# Patient Record
Sex: FEMALE | Race: WHITE | NOT HISPANIC OR LATINO | Employment: STUDENT | URBAN - NONMETROPOLITAN AREA
[De-identification: names, ages, dates, MRNs, and addresses within clinical notes are randomized per-mention and may not be internally consistent; named-entity substitution may affect disease eponyms.]

---
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
## 2024-05-15 ENCOUNTER — OFFICE VISIT (OUTPATIENT)
Dept: FAMILY MEDICINE CLINIC | Facility: CLINIC | Age: 19
End: 2024-05-15
Payer: COMMERCIAL

## 2024-05-15 VITALS
RESPIRATION RATE: 18 BRPM | HEART RATE: 66 BPM | WEIGHT: 183.2 LBS | DIASTOLIC BLOOD PRESSURE: 64 MMHG | OXYGEN SATURATION: 98 % | SYSTOLIC BLOOD PRESSURE: 106 MMHG | TEMPERATURE: 98.2 F | HEIGHT: 71 IN | BODY MASS INDEX: 25.65 KG/M2

## 2024-05-15 DIAGNOSIS — F41.9 ANXIETY: ICD-10-CM

## 2024-05-15 DIAGNOSIS — Z00.00 WELL ADULT EXAM: Primary | ICD-10-CM

## 2024-05-15 DIAGNOSIS — M25.362 INSTABILITY OF BOTH KNEE JOINTS: ICD-10-CM

## 2024-05-15 DIAGNOSIS — M25.361 INSTABILITY OF BOTH KNEE JOINTS: ICD-10-CM

## 2024-05-15 PROBLEM — Z82.49 FAMILY HISTORY OF HEART DISEASE: Status: ACTIVE | Noted: 2023-05-05

## 2024-05-15 PROCEDURE — 2014F MENTAL STATUS ASSESS: CPT | Performed by: FAMILY MEDICINE

## 2024-05-15 PROCEDURE — 99395 PREV VISIT EST AGE 18-39: CPT | Performed by: FAMILY MEDICINE

## 2024-05-15 NOTE — PROGRESS NOTES
Female Physical Note      Date: 05/15/2024   Patient Name: Nereida Morfin  : 2005   MRN: [id number]     Chief Complaint:    Chief Complaint   Patient presents with    Annual Exam    Knee Injury     Rebel knees feel unstable.       History of Present Illness: Nereida Morfin is a 19 y.o. female who is here today for their annual health maintenance and physical.  Patient has been doing relatively well since last being seen but has been having some difficulty with bilateral knee pain.  Patient states that she has been having little bit of problems with pain with a little bit of instability.  She has noted that her left knee has had dislocation of her knee Recently but denies any recent trauma.  She does not have any problems with swelling at present time.  She is also having little bit increase in her anxiety.  She does not wish to start medication and has not seen her counselor for quite some time.  Otherwise she is doing well with her usual activity, appetite and sleep.  Patient denies any other cardiovascular, respiratory, gastrointestinal, urologic or neurologic complaints.      Subjective      Review of Systems:   Review of Systems   Constitutional:  Negative for activity change, appetite change and fatigue.   Respiratory:  Negative for cough, shortness of breath and wheezing.    Cardiovascular:  Negative for chest pain, palpitations and leg swelling.   Gastrointestinal:  Negative for abdominal distention, abdominal pain, blood in stool, constipation, diarrhea, nausea, vomiting, GERD and indigestion.   Genitourinary:  Negative for dysuria, flank pain, frequency, hematuria and urgency.   Musculoskeletal:  Negative for arthralgias, joint swelling, myalgias and bursitis.   Neurological:  Negative for dizziness, tremors, seizures, syncope, weakness, light-headedness, numbness, headache, memory problem and confusion.   Psychiatric/Behavioral:  Negative for sleep disturbance and depressed mood. The  patient is not nervous/anxious.        Past Medical History, Social History, Family History and Care Team were all reviewed with patient and updated as appropriate.     Medications:     Current Outpatient Medications:     Ketoconazole 1 % shampoo, Apply 1 application topically Every 72 (Seventy-Two) Hours., Disp: 200 mL, Rfl: 11    Allergies:   Allergies   Allergen Reactions    Other Anaphylaxis     INSECT STINGS - BEE STING       Immunizations:  Health Maintenance Summary            Postponed - COVID-19 Vaccine (2 - 2023-24 season) Postponed until 5/15/2025      05/15/2024  Postponed until 5/15/2025 by Lore Sheldon MA (Patient Refused)    01/05/2023  Imm Admin: Covid-19 (Pfizer) Gray Cap Monovalent              INFLUENZA VACCINE (Yearly - August to March) Next due on 8/1/2024 01/06/2023  Imm Admin: Fluzone (or Fluarix & Flulaval for VFC) >6mos    11/21/2018  Imm Admin: Flu Vaccine Quad PF >36MO    01/10/2014  Imm Admin: Influenza TIV (IM)    01/02/2013  Imm Admin: Influenza TIV (IM)    02/11/2009  Imm Admin: Influenza TIV (IM)    Only the first 5 history entries have been loaded, but more history exists.              ANNUAL PHYSICAL (Yearly) Next due on 5/15/2025      05/15/2024  Done    10/07/2022  Done              BMI FOLLOWUP (Yearly) Next due on 5/15/2025      05/15/2024  [employer]: WORKFLOW - QUALITY MEASUREMENT - DOCUMENTED WEIGHT FOLLOW-UP PLAN              TDAP/TD VACCINES (2 - Td or Tdap) Next due on 8/18/2025 08/18/2015  Imm Admin: Tdap              Pneumococcal Vaccine 0-64 (Series Information) Aged Out      07/19/2007  Imm Admin: PEDS-Pneumococcal Conjugate (PCV7)    2004  Imm Admin: PEDS-Pneumococcal Conjugate (PCV7)    2004  Imm Admin: PEDS-Pneumococcal Conjugate (PCV7)    2004  Imm Admin: PEDS-Pneumococcal Conjugate (PCV7)              MENINGOCOCCAL VACCINE (Series Information) Completed      02/01/2022  Imm Admin: Meningococcal Conjugate    08/18/2015  Imm Admin:  "Meningococcal MCV4P (Menactra)    08/18/2015  Imm Admin: MCV4 Unspecified              HPV VACCINES (Series Information) Completed      02/01/2022  Imm Admin: Hpv9    08/18/2015  Imm Admin: HPV Quadrivalent              HEPATITIS C SCREENING  Completed      10/07/2022  Hepatitis C Antibody              Discontinued - CHLAMYDIA SCREENING  Discontinued        Frequency changed to Never automatically (Topic No Longer Applies)    10/07/2022  Chlamydia trachomatis, HILL component of Chlamydia trachomatis, Neisseria gonorrhoeae, PCR - Urine, Urine, Clean Catch                     No orders of the defined types were placed in this encounter.       Colorectal Screening:   Deferred.  Last Completed Colonoscopy       This patient has no relevant Health Maintenance data.          Pap: Deferred.  Last Completed Pap Smear       This patient has no relevant Health Maintenance data.           Mammogram: Deferred.  Last Completed Mammogram       This patient has no relevant Health Maintenance data.             CT for Smoker (Age 50-80, 20 pk yr):   Deferred.  Bone Density/DEXA (Age 65 or high risk): Deferred.  Hep C (Age 18-79 once): Previously ordered.  HIV (Age 15-65 once): No results found for: \"HIV1X2\"  A1c: No results found for: \"HGBA1C\"   Lipid panel:  No results found for: \"LIPIDEXCLUSI\"    The ASCVD Risk score (Nathan DK, et al., 2019) failed to calculate for the following reasons:    The 2019 ASCVD risk score is only valid for ages 40 to 79    Dermatology: Advised if necessary.  Ophthalmologist: Advised.  Dentist: Advised.    Tobacco Use: Low Risk  (5/15/2024)    Patient History     Smoking Tobacco Use: Never     Smokeless Tobacco Use: Never     Passive Exposure: Never       Social History     Substance and Sexual Activity   Alcohol Use Not Currently    Comment: 1x per month        Social History     Substance and Sexual Activity   Drug Use Never        Diet/Physical activity: Well-balanced diet/daily exercise.    Sexual " "Health: No issues at present time.   Menopause: No issues at present time.  Menstrual Cycles: No issues at present time.    Depression: PHQ-2 Depression Screening  PHQ-9 Total Score: 0     Measures:   Advanced Care Planning:   Did not discuss.    Smoking Cessation:   Non-smoker.    Objective     Physical Exam:  Vital Signs:   Vitals:    05/15/24 1001   BP: 106/64   BP Location: Left arm   Patient Position: Sitting   Cuff Size: Adult   Pulse: 66   Resp: 18   Temp: 98.2 °F (36.8 °C)   TempSrc: Temporal   SpO2: 98%   Weight: 83.1 kg (183 lb 3.2 oz)   Height: 180.3 cm (71\")     Body mass index is 25.55 kg/m².   Facility age limit for growth %deanne is 20 years.    Physical Exam  Vitals and nursing note reviewed.   Constitutional:       Appearance: Normal appearance.   HENT:      Head: Normocephalic and atraumatic.      Nose: Nose normal.      Mouth/Throat:      Pharynx: Oropharynx is clear.   Eyes:      Extraocular Movements: Extraocular movements intact.      Pupils: Pupils are equal, round, and reactive to light.   Neck:      Thyroid: No thyroid mass or thyromegaly.      Trachea: Trachea normal.   Cardiovascular:      Rate and Rhythm: Normal rate and regular rhythm.      Pulses: Normal pulses. No decreased pulses.      Heart sounds: Normal heart sounds.   Pulmonary:      Effort: Pulmonary effort is normal.      Breath sounds: Normal breath sounds.   Abdominal:      General: Abdomen is flat. Bowel sounds are normal.      Palpations: Abdomen is soft.      Tenderness: There is no abdominal tenderness.   Musculoskeletal:      Cervical back: Neck supple.      Right lower leg: No edema.      Left lower leg: No edema.   Lymphadenopathy:      Cervical: No cervical adenopathy.   Skin:     General: Skin is warm and dry.   Neurological:      General: No focal deficit present.      Mental Status: She is alert and oriented to person, place, and time.      Sensory: Sensation is intact.      Motor: Motor function is intact.      " Coordination: Coordination is intact.   Psychiatric:         Attention and Perception: Attention normal.         Mood and Affect: Mood normal.         Speech: Speech normal.         Behavior: Behavior normal.         POCT Results (if applicable);   Results for orders placed or performed in visit on 10/07/22   Chlamydia trachomatis, Neisseria gonorrhoeae, PCR - Urine, Urine, Clean Catch    Specimen: Urine, Clean Catch   Result Value Ref Range    Chlamydia trachomatis, HILL Negative Negative    Neisseria gonorrhoeae, HILL Negative Negative   CBC (No Diff)    Specimen: Arm, Right; Blood   Result Value Ref Range    WBC 8.62 3.40 - 10.80 10*3/mm3    RBC 5.45 (H) 3.77 - 5.28 10*6/mm3    Hemoglobin 15.7 12.0 - 15.9 g/dL    Hematocrit 45.0 34.0 - 46.6 %    MCV 82.6 79.0 - 97.0 fL    MCH 28.8 26.6 - 33.0 pg    MCHC 34.9 31.5 - 35.7 g/dL    RDW 12.6 12.3 - 15.4 %    RDW-SD 37.2 37.0 - 54.0 fl    MPV 8.8 6.0 - 12.0 fL    Platelets 370 140 - 450 10*3/mm3   Comprehensive Metabolic Panel    Specimen: Arm, Right; Blood   Result Value Ref Range    Glucose 94 65 - 99 mg/dL    BUN 10 6 - 20 mg/dL    Creatinine 0.87 0.57 - 1.00 mg/dL    Sodium 138 136 - 145 mmol/L    Potassium 3.7 3.5 - 5.2 mmol/L    Chloride 102 98 - 107 mmol/L    CO2 23.5 22.0 - 29.0 mmol/L    Calcium 10.0 8.6 - 10.5 mg/dL    Total Protein 7.4 6.0 - 8.5 g/dL    Albumin 4.60 3.50 - 5.20 g/dL    ALT (SGPT) 14 1 - 33 U/L    AST (SGOT) 13 1 - 32 U/L    Alkaline Phosphatase 62 43 - 101 U/L    Total Bilirubin 0.6 0.0 - 1.2 mg/dL    Globulin 2.8 gm/dL    A/G Ratio 1.6 g/dL    BUN/Creatinine Ratio 11.5 7.0 - 25.0    Anion Gap 12.5 5.0 - 15.0 mmol/L    eGFR 99.2 >60.0 mL/min/1.73   Hepatitis C Antibody    Specimen: Arm, Right; Blood   Result Value Ref Range    Hepatitis C Ab Non-Reactive Non-Reactive   Lipid Panel    Specimen: Arm, Right; Blood   Result Value Ref Range    Total Cholesterol 213 (H) 0 - 200 mg/dL    Triglycerides 105 0 - 150 mg/dL    HDL Cholesterol 60 40 - 60  mg/dL    LDL Cholesterol  134 (H) 0 - 100 mg/dL    VLDL Cholesterol 19 5 - 40 mg/dL    LDL/HDL Ratio 2.20    TSH Rfx On Abnormal To Free T4    Specimen: Arm, Right; Blood   Result Value Ref Range    TSH 1.680 0.270 - 4.200 uIU/mL   Vitamin B12    Specimen: Arm, Right; Blood   Result Value Ref Range    Vitamin B-12 726 211 - 946 pg/mL   Urinalysis With Culture If Indicated - Urine, Clean Catch    Specimen: Urine, Clean Catch   Result Value Ref Range    Color, UA Dark Yellow (A) Yellow, Straw    Appearance, UA Turbid (A) Clear    pH, UA 5.5 5.0 - 8.0    Specific Gravity, UA >=1.030 1.005 - 1.030    Glucose, UA Negative Negative    Ketones, UA Trace (A) Negative    Bilirubin, UA Negative Negative    Blood, UA Negative Negative    Protein, UA Trace (A) Negative    Leuk Esterase, UA Small (1+) (A) Negative    Nitrite, UA Negative Negative    Urobilinogen, UA 0.2 E.U./dL 0.2 - 1.0 E.U./dL   Urinalysis, Microscopic Only - Urine, Clean Catch    Specimen: Urine, Clean Catch   Result Value Ref Range    RBC, UA None Seen None Seen, 0-2 /HPF    WBC, UA 3-5 (A) None Seen, 0-2 /HPF    Bacteria, UA 1+ (A) None Seen /HPF    Squamous Epithelial Cells, UA None Seen None Seen, 0-2 /HPF    Hyaline Casts, UA None Seen None Seen /LPF    Amorphous Crystals, UA Large/3+ None Seen /HPF    Methodology Manual Light Microscopy         Procedures    Assessment / Plan      Assessment/Plan:   Diagnoses and all orders for this visit:    1. Well adult exam (Primary)   Patient did have a wellness exam performed today that did relatively well.  She did have an increase in anxiety score but her depression scores were appropriate.  We did discuss safety issues as well as anticipatory guidance.  We will continue to monitor her symptoms and will reevaluate if she has any problems or concerns prior to her next scheduled follow-up.    2. Anxiety   Patient is anxiety score is little bit elevated at present time.  We have discussed options and have  "encouraged her to schedule follow-up with a new counselor and to consider the initiation of medication if she shows no improvement.  I do agree that counseling is best and she will let us know if she wishes to start a SSRI.    3. Instability of both knee joints  Patient has had abnormalities with respect to both her knees.  Apparently she does have a strong family history of laxity of her knees and has had apparently \"dislocation\" of her kneecaps in the past.  She has been advised to see a orthopedist but she has not done so.  She has requested an orthopedist in [place], we will make arrangements.  -     Ambulatory Referral to Orthopedic Surgery         Healthcare Maintenance:  Counseling provided based on age appropriate USPSTF guidelines.  BMI is >= 25 and <30. (Overweight) The following options were offered after discussion;: exercise counseling/recommendations and nutrition counseling/recommendations    Nereida E Morfin voices understanding and acceptance of this advice and will call back with any further questions or concerns. AVS with preventive healthcare tips printed for patient.     Follow Up:   No follow-ups on file.    At [facility], we believe that sharing information builds trust and better relationships. You are receiving this note because you recently visited [facility]. It is possible you will see health information before a provider has talked with you about it. This kind of information can be easy to misunderstand. To help you fully understand what it means for your health, we urge you to discuss this note with your provider.    Les Lugo MD  [facility]  "

## 2025-05-14 ENCOUNTER — OFFICE VISIT (OUTPATIENT)
Dept: FAMILY MEDICINE CLINIC | Facility: CLINIC | Age: 20
End: 2025-05-14
Payer: COMMERCIAL

## 2025-05-14 VITALS
HEART RATE: 86 BPM | SYSTOLIC BLOOD PRESSURE: 104 MMHG | WEIGHT: 176 LBS | DIASTOLIC BLOOD PRESSURE: 70 MMHG | HEIGHT: 71 IN | OXYGEN SATURATION: 97 % | RESPIRATION RATE: 18 BRPM | TEMPERATURE: 98 F | BODY MASS INDEX: 24.64 KG/M2

## 2025-05-14 DIAGNOSIS — L40.9 PSORIASIS: Primary | ICD-10-CM

## 2025-05-14 PROCEDURE — 99213 OFFICE O/P EST LOW 20 MIN: CPT | Performed by: FAMILY MEDICINE

## 2025-05-14 PROCEDURE — 1159F MED LIST DOCD IN RCRD: CPT | Performed by: FAMILY MEDICINE

## 2025-05-14 PROCEDURE — 1160F RVW MEDS BY RX/DR IN RCRD: CPT | Performed by: FAMILY MEDICINE

## 2025-05-14 RX ORDER — BETAMETHASONE VALERATE 1.2 MG/G
1 CREAM TOPICAL 2 TIMES DAILY
Qty: 45 G | Refills: 11 | Status: SHIPPED | OUTPATIENT
Start: 2025-05-14 | End: 2025-05-14

## 2025-05-14 RX ORDER — BETAMETHASONE VALERATE 1.2 MG/G
1 CREAM TOPICAL 2 TIMES DAILY
Qty: 90 G | Refills: 11 | Status: SHIPPED | OUTPATIENT
Start: 2025-05-14

## 2025-05-14 NOTE — PROGRESS NOTES
Follow Up Office Visit      Date: 2025   Patient Name: eNreida Morfin  : 2005   MRN: [id number]     Chief Complaint:    Chief Complaint   Patient presents with    Psoriasis       History of Present Illness: Nereida Morfin is a 20 y.o. female who is here today for assessment of psoriasis    History of Present Illness  The patient is a 20-year-old female who presents for evaluation of psoriasis.    She has been diagnosed with psoriasis, which she has not yet consulted a dermatologist for. The condition is localized to her abdomen, presenting as spots that extend downwards. She reports no issues with her elbows or neck. Her knees, hands, and elbows are also unaffected. The onset of the current flare-up was a few months ago, marking the first instance of psoriasis on her body, although she has a history of scalp psoriasis. The current manifestation is neither itchy nor painful, but it has persisted for some time. She reports no joint pain in her hands, feet, or knees. She has not applied any topical treatments to the affected area but has been maintaining hygiene with face wash instead of regular soap, followed by the application of ice and lotion to keep the skin moisturized and prevent irritation.       Subjective      Review of Systems:   Review of Systems   Constitutional:  Negative for activity change, appetite change and fatigue.   Respiratory:  Negative for cough, chest tightness, shortness of breath and wheezing.    Cardiovascular:  Negative for chest pain, palpitations and leg swelling.   Gastrointestinal:  Negative for abdominal distention, abdominal pain, blood in stool, constipation, diarrhea, nausea, vomiting, GERD and indigestion.   Genitourinary:  Negative for difficulty urinating, dysuria, flank pain, frequency, hematuria and urgency.   Musculoskeletal:  Negative for arthralgias, back pain, gait problem, joint swelling and myalgias.   Skin:  Positive for rash.    Neurological:  Negative for dizziness, tremors, seizures, syncope, weakness, light-headedness, numbness, headache and memory problem.   Psychiatric/Behavioral:  Negative for sleep disturbance and depressed mood. The patient is not nervous/anxious.        I have reviewed the patients family history, social history, past medical history, past surgical history and have updated it as appropriate.     Medications:     Current Outpatient Medications:     betamethasone valerate (VALISONE) 0.1 % cream, Apply 1 Application topically to the appropriate area as directed 2 (Two) Times a Day., Disp: 90 g, Rfl: 11    Ketoconazole 1 % shampoo, Apply 1 application topically Every 72 (Seventy-Two) Hours., Disp: 200 mL, Rfl: 11    Allergies:   Allergies   Allergen Reactions    Other Anaphylaxis     INSECT STINGS - BEE STING       Immunizations:   Immunization History   Administered Date(s) Administered    Covid-19 (Pfizer) Gray Cap Monovalent 01/05/2023    DTaP / Hep B / IPV 2004    DTaP, Unspecified 2004, 07/19/2007, 05/29/2008    Flu Vaccine Quad PF >36MO 11/21/2018    Fluzone (or Fluarix & Flulaval for VFC) >6mos 01/06/2023    HPV Quadrivalent 08/18/2015    Hep A, 2 Dose 08/18/2015, 08/19/2016    Hep B, Adolescent or Pediatric 2004, 2004    Hib (PRP-OMP) 2004, 2004, 07/19/2007    Hpv9 02/01/2022    IPV 2004, 05/29/2008    Influenza TIV (IM) 02/11/2009, 01/02/2013, 01/10/2014    MCV4 Unspecified 08/18/2015    MMR 05/06/2005, 05/29/2008    Meningococcal Conjugate 02/01/2022    Meningococcal MCV4P (Menactra) 08/18/2015    PEDS-Pneumococcal Conjugate (PCV7) 2004, 2004, 2004, 07/19/2007    Tdap 08/18/2015    Varicella 05/06/2005, 08/18/2015        Objective     Physical Exam: Please see above  Vital Signs:   Vitals:    05/14/25 1448   BP: 104/70   BP Location: Left arm   Patient Position: Sitting   Cuff Size: Adult   Pulse: 86   Resp: 18   Temp: 98 °F (36.7 °C)   TempSrc:  "Temporal   SpO2: 97%   Weight: 79.8 kg (176 lb)   Height: 180.3 cm (70.98\")     Body mass index is 24.56 kg/m².  BMI is within normal parameters. No other follow-up for BMI required.       Physical Exam  Vitals and nursing note reviewed.   Constitutional:       Appearance: Normal appearance.   HENT:      Head: Normocephalic and atraumatic.      Nose: Nose normal.      Mouth/Throat:      Pharynx: Oropharynx is clear.   Eyes:      Extraocular Movements: Extraocular movements intact.      Pupils: Pupils are equal, round, and reactive to light.   Neck:      Thyroid: No thyroid mass or thyromegaly.      Trachea: Trachea normal.   Cardiovascular:      Rate and Rhythm: Normal rate and regular rhythm.      Pulses: Normal pulses. No decreased pulses.      Heart sounds: Normal heart sounds.   Pulmonary:      Effort: Pulmonary effort is normal.      Breath sounds: Normal breath sounds.   Abdominal:      General: Abdomen is flat. Bowel sounds are normal.      Palpations: Abdomen is soft.      Tenderness: There is no abdominal tenderness.   Musculoskeletal:      Cervical back: Neck supple.      Right lower leg: No edema.      Left lower leg: No edema.   Lymphadenopathy:      Cervical: No cervical adenopathy.   Skin:     General: Skin is warm and dry.      Findings: Rash present. Rash is macular, papular and scaling.      Comments: Patient has rash on her anterior chest consistent with psoriasis   Neurological:      General: No focal deficit present.      Mental Status: She is alert and oriented to person, place, and time.      Sensory: Sensation is intact.      Motor: Motor function is intact.      Coordination: Coordination is intact.   Psychiatric:         Attention and Perception: Attention normal.         Mood and Affect: Mood normal.         Speech: Speech normal.         Behavior: Behavior normal.         Procedures    Results:   Labs:   TSH   Date Value Ref Range Status   10/07/2022 1.680 0.270 - 4.200 uIU/mL Final    "     POCT Results (if applicable):   Results for orders placed or performed in visit on 10/07/22   Chlamydia trachomatis, Neisseria gonorrhoeae, PCR - Urine, Urine, Clean Catch    Collection Time: 10/07/22 11:49 AM    Specimen: Urine, Clean Catch   Result Value Ref Range    Chlamydia trachomatis, HILL Negative Negative    Neisseria gonorrhoeae, HILL Negative Negative   CBC (No Diff)    Collection Time: 10/07/22 11:49 AM    Specimen: Arm, Right; Blood   Result Value Ref Range    WBC 8.62 3.40 - 10.80 10*3/mm3    RBC 5.45 (H) 3.77 - 5.28 10*6/mm3    Hemoglobin 15.7 12.0 - 15.9 g/dL    Hematocrit 45.0 34.0 - 46.6 %    MCV 82.6 79.0 - 97.0 fL    MCH 28.8 26.6 - 33.0 pg    MCHC 34.9 31.5 - 35.7 g/dL    RDW 12.6 12.3 - 15.4 %    RDW-SD 37.2 37.0 - 54.0 fl    MPV 8.8 6.0 - 12.0 fL    Platelets 370 140 - 450 10*3/mm3   Comprehensive Metabolic Panel    Collection Time: 10/07/22 11:49 AM    Specimen: Arm, Right; Blood   Result Value Ref Range    Glucose 94 65 - 99 mg/dL    BUN 10 6 - 20 mg/dL    Creatinine 0.87 0.57 - 1.00 mg/dL    Sodium 138 136 - 145 mmol/L    Potassium 3.7 3.5 - 5.2 mmol/L    Chloride 102 98 - 107 mmol/L    CO2 23.5 22.0 - 29.0 mmol/L    Calcium 10.0 8.6 - 10.5 mg/dL    Total Protein 7.4 6.0 - 8.5 g/dL    Albumin 4.60 3.50 - 5.20 g/dL    ALT (SGPT) 14 1 - 33 U/L    AST (SGOT) 13 1 - 32 U/L    Alkaline Phosphatase 62 43 - 101 U/L    Total Bilirubin 0.6 0.0 - 1.2 mg/dL    Globulin 2.8 gm/dL    A/G Ratio 1.6 g/dL    BUN/Creatinine Ratio 11.5 7.0 - 25.0    Anion Gap 12.5 5.0 - 15.0 mmol/L    eGFR 99.2 >60.0 mL/min/1.73   Hepatitis C Antibody    Collection Time: 10/07/22 11:49 AM    Specimen: Arm, Right; Blood   Result Value Ref Range    Hepatitis C Ab Non-Reactive Non-Reactive   Lipid Panel    Collection Time: 10/07/22 11:49 AM    Specimen: Arm, Right; Blood   Result Value Ref Range    Total Cholesterol 213 (H) 0 - 200 mg/dL    Triglycerides 105 0 - 150 mg/dL    HDL Cholesterol 60 40 - 60 mg/dL    LDL Cholesterol   134 (H) 0 - 100 mg/dL    VLDL Cholesterol 19 5 - 40 mg/dL    LDL/HDL Ratio 2.20    TSH Rfx On Abnormal To Free T4    Collection Time: 10/07/22 11:49 AM    Specimen: Arm, Right; Blood   Result Value Ref Range    TSH 1.680 0.270 - 4.200 uIU/mL   Vitamin B12    Collection Time: 10/07/22 11:49 AM    Specimen: Arm, Right; Blood   Result Value Ref Range    Vitamin B-12 726 211 - 946 pg/mL   Urinalysis With Culture If Indicated - Urine, Clean Catch    Collection Time: 10/07/22 11:49 AM    Specimen: Urine, Clean Catch   Result Value Ref Range    Color, UA Dark Yellow (A) Yellow, Straw    Appearance, UA Turbid (A) Clear    pH, UA 5.5 5.0 - 8.0    Specific Gravity, UA >=1.030 1.005 - 1.030    Glucose, UA Negative Negative    Ketones, UA Trace (A) Negative    Bilirubin, UA Negative Negative    Blood, UA Negative Negative    Protein, UA Trace (A) Negative    Leuk Esterase, UA Small (1+) (A) Negative    Nitrite, UA Negative Negative    Urobilinogen, UA 0.2 E.U./dL 0.2 - 1.0 E.U./dL   Urinalysis, Microscopic Only - Urine, Clean Catch    Collection Time: 10/07/22 11:49 AM    Specimen: Urine, Clean Catch   Result Value Ref Range    RBC, UA None Seen None Seen, 0-2 /HPF    WBC, UA 3-5 (A) None Seen, 0-2 /HPF    Bacteria, UA 1+ (A) None Seen /HPF    Squamous Epithelial Cells, UA None Seen None Seen, 0-2 /HPF    Hyaline Casts, UA None Seen None Seen /LPF    Amorphous Crystals, UA Large/3+ None Seen /HPF    Methodology Manual Light Microscopy        Imaging:   No valid procedures specified.     Measures:   Advanced Care Planning:   Did not discuss.    Smoking Cessation:   Non-smoker    Assessment / Plan      Assessment/Plan:   Diagnoses and all orders for this visit:    1. Psoriasis (Primary)  Patient does have a rash that has recurred that is similar to her previous psoriasis.  Patient has tried moisturizers excetra without improvement.  She is not having any joint symptomatology at present time.  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  We will initiate a topical steroid cream to see if this will help with her symptomatology.  We may need further evaluation in the future by dermatology.  Hopefully this will show some improvement and she will contact us if she has any worsening complaints.  It may be necessary for us to perform a telehealth evaluation if she does not show improvement with the topical treatment.  If she develops any joint symptomatology, we will further assess and consider future laboratory data.  -     Discontinue: betamethasone valerate (VALISONE) 0.1 % cream; Apply 1 Application topically to the appropriate area as directed 2 (Two) Times a Day.  Dispense: 45 g; Refill: 11  -     betamethasone valerate (VALISONE) 0.1 % cream; Apply 1 Application topically to the appropriate area as directed 2 (Two) Times a Day.  Dispense: 90 g; Refill: 11        Follow Up:   Return if symptoms worsen or fail to improve.      At [facility], we believe that sharing information builds trust and better relationships. You are receiving this note because you recently visited [facility]. It is possible you will see health information before a provider has talked with you about it. This kind of information can be easy to misunderstand. To help you fully understand what it means for your health, we urge you to discuss this note with your provider.    Les Lugo MD  [facility]    Patient or patient representative verbalized consent for the use of Ambient Listening during the visit with  Les Lugo MD for chart documentation. 5/15/2025  15:01 EDT